# Patient Record
Sex: FEMALE | Race: WHITE | URBAN - METROPOLITAN AREA
[De-identification: names, ages, dates, MRNs, and addresses within clinical notes are randomized per-mention and may not be internally consistent; named-entity substitution may affect disease eponyms.]

---

## 2024-09-13 ENCOUNTER — OFFICE VISIT (OUTPATIENT)
Dept: ENT CLINIC | Age: 39
End: 2024-09-13

## 2024-09-13 VITALS
SYSTOLIC BLOOD PRESSURE: 121 MMHG | TEMPERATURE: 98 F | DIASTOLIC BLOOD PRESSURE: 82 MMHG | HEART RATE: 74 BPM | BODY MASS INDEX: 26.49 KG/M2 | WEIGHT: 159 LBS | HEIGHT: 65 IN

## 2024-09-13 DIAGNOSIS — H90.2 MIDDLE EAR CONDUCTIVE HEARING LOSS: ICD-10-CM

## 2024-09-13 DIAGNOSIS — H65.191 ACUTE EFFUSION OF RIGHT EAR: Primary | ICD-10-CM

## 2024-09-13 RX ORDER — METHYLPREDNISOLONE 4 MG
TABLET, DOSE PACK ORAL
Qty: 1 KIT | Refills: 0 | Status: SHIPPED | OUTPATIENT
Start: 2024-09-13

## 2024-09-13 RX ORDER — LIDOCAINE HYDROCHLORIDE AND EPINEPHRINE BITARTRATE 20; .01 MG/ML; MG/ML
1 INJECTION, SOLUTION SUBCUTANEOUS ONCE
Status: SHIPPED | OUTPATIENT
Start: 2024-09-13

## 2024-09-26 ENCOUNTER — OFFICE VISIT (OUTPATIENT)
Dept: ENT CLINIC | Age: 39
End: 2024-09-26
Payer: COMMERCIAL

## 2024-09-26 VITALS
DIASTOLIC BLOOD PRESSURE: 79 MMHG | HEIGHT: 65 IN | TEMPERATURE: 98.1 F | SYSTOLIC BLOOD PRESSURE: 135 MMHG | WEIGHT: 160.4 LBS | HEART RATE: 66 BPM | BODY MASS INDEX: 26.73 KG/M2

## 2024-09-26 DIAGNOSIS — H90.2 MIDDLE EAR CONDUCTIVE HEARING LOSS: ICD-10-CM

## 2024-09-26 DIAGNOSIS — H65.191 ACUTE EFFUSION OF RIGHT EAR: Primary | ICD-10-CM

## 2024-09-26 PROCEDURE — 1036F TOBACCO NON-USER: CPT | Performed by: OTOLARYNGOLOGY

## 2024-09-26 PROCEDURE — G8427 DOCREV CUR MEDS BY ELIG CLIN: HCPCS | Performed by: OTOLARYNGOLOGY

## 2024-09-26 PROCEDURE — 99213 OFFICE O/P EST LOW 20 MIN: CPT | Performed by: OTOLARYNGOLOGY

## 2024-09-26 PROCEDURE — G8419 CALC BMI OUT NRM PARAM NOF/U: HCPCS | Performed by: OTOLARYNGOLOGY

## 2024-09-26 RX ORDER — METHYLPREDNISOLONE 4 MG
TABLET, DOSE PACK ORAL
Qty: 1 KIT | Refills: 0 | Status: SHIPPED | OUTPATIENT
Start: 2024-09-26

## 2024-10-07 ENCOUNTER — PROCEDURE VISIT (OUTPATIENT)
Dept: AUDIOLOGY | Age: 39
End: 2024-10-07
Payer: COMMERCIAL

## 2024-10-07 ENCOUNTER — OFFICE VISIT (OUTPATIENT)
Dept: ENT CLINIC | Age: 39
End: 2024-10-07
Payer: COMMERCIAL

## 2024-10-07 VITALS
WEIGHT: 161.6 LBS | BODY MASS INDEX: 26.92 KG/M2 | HEART RATE: 82 BPM | TEMPERATURE: 98.5 F | HEIGHT: 65 IN | SYSTOLIC BLOOD PRESSURE: 131 MMHG | DIASTOLIC BLOOD PRESSURE: 81 MMHG

## 2024-10-07 DIAGNOSIS — H90.41 SENSORINEURAL HEARING LOSS (SNHL) OF RIGHT EAR WITH UNRESTRICTED HEARING OF LEFT EAR: Primary | ICD-10-CM

## 2024-10-07 DIAGNOSIS — H91.91 HEARING LOSS OF RIGHT EAR, UNSPECIFIED HEARING LOSS TYPE: Primary | ICD-10-CM

## 2024-10-07 PROCEDURE — 99213 OFFICE O/P EST LOW 20 MIN: CPT | Performed by: OTOLARYNGOLOGY

## 2024-10-07 PROCEDURE — G8419 CALC BMI OUT NRM PARAM NOF/U: HCPCS | Performed by: OTOLARYNGOLOGY

## 2024-10-07 PROCEDURE — G8427 DOCREV CUR MEDS BY ELIG CLIN: HCPCS | Performed by: OTOLARYNGOLOGY

## 2024-10-07 PROCEDURE — 92567 TYMPANOMETRY: CPT

## 2024-10-07 PROCEDURE — 1036F TOBACCO NON-USER: CPT | Performed by: OTOLARYNGOLOGY

## 2024-10-07 PROCEDURE — G8484 FLU IMMUNIZE NO ADMIN: HCPCS | Performed by: OTOLARYNGOLOGY

## 2024-10-07 PROCEDURE — 92557 COMPREHENSIVE HEARING TEST: CPT

## 2024-10-07 NOTE — PROGRESS NOTES
Chanelle Barrett   1985, 39 y.o. female   [id number]       Referring Provider: Tory Alejandre MD  Referral Type: In an order in Epic    Reason for Visit: Evaluation of suspected change in hearing, tinnitus, or balance.    ADULT AUDIOLOGIC EVALUATION      Chanelle Barrett is a 39 y.o. female seen today, 10/7/2024 , for an initial audiologic evaluation.  Patient was seen by Tory Alejandre MD following today's evaluation.    AUDIOLOGIC AND OTHER PERTINENT MEDICAL HISTORY:      Chanelle Barrett reports having double ear infection with tympanic membrane/eardrum perforation in the right ear. At the time of perforation, there was drainage for about 5 days. She would like to obtain baseline test to see if hearing has come back.     She denied otalgia, aural fullness, otorrhea, tinnitus, dizziness, imbalance, history of falls, history of noise exposure, history of head trauma, history of ear surgery, and family history of hearing loss    Date: 10/7/2024     IMPRESSIONS:      Today's results revealed low frequency sensorineural hearing loss in the right ear with Excellent speech understanding when in quiet, bilaterally. Tympanometry indicates normal middle ear function. Discussed test results and implications with patient.  Follow medical recommendations of Tory Alejandre MD.     ASSESSMENT AND FINDINGS:     Otoscopy unremarkable.    RIGHT EAR:  Hearing Sensitivity: Mild sensorineural hearing loss at 250Hz, rising to normal hearing sensitivity.  Speech Recognition Threshold: 15 dB HL  Word Recognition: Excellent 100%, based on NU-6 25-word list at 50 dBHL using recorded speech stimuli.    Tympanometry: Normal peak pressure and compliance, Type A tympanogram, consistent with normal middle ear function.       LEFT EAR:  Hearing Sensitivity:Hearing sensitivity within normal limits from 250-8000 Hz  Speech Recognition Threshold: 10 dB HL  Word Recognition: Excellent 100%, based on NU-6 25-word list at 45 dBHL using

## 2024-10-07 NOTE — PROGRESS NOTES
FOLLOW UP VISIT:    CHIEF COMPLAINT: Hearing loss right ear    INTERIM HISTORY: Seen with middle ear effusions 3-1/2 weeks ago.  The right ear was worse than the left and I performed a myringotomy because of discomfort.  When I saw her back 2 weeks later the myringotomy had healed.  There is still some concern about residual fluid in the right ear as well as some in the left.  I gave her a Medrol Dosepak at that time.  An audiogram was ordered and was performed today.      PAST MEDICAL HISTORY:   Social History     Tobacco Use   Smoking Status Never   Smokeless Tobacco Never                                                    Social History     Substance and Sexual Activity   Alcohol Use Yes                                                  No current outpatient medications on file.                                                 Past Medical History:   Diagnosis Date    Allergic rhinitis     Hearing loss     Tinnitus                                                   No past surgical history on file.    FAMILY HISTORY: Family history reviewed and except as pertinent to the interim history is not contributory.      REVIEW OF SYSTEMS:  All pertinent positive and negative findings included in HPI.  Otherwise, all other systems are reviewed and are negative    PHYSICAL EXAMINATION:   GENERAL: wdwn- no acute distress  RESPIRATORY: No stridor.  COMMUNICATION :  Normal voice  MENTAL STATUS: Alert and oriented x3  HEAD AND FACE: No skin lesions of the face.  EXTERNAL EARS AND NOSE: The external ears and nasal pyramid are normal.  FACIAL MUSCLES: All branches of the facial nerve intact.  FACE PALPATION: Zygomatic arches and orbital rims are intact.  No tenderness over the sinuses.  EXTRAOCULAR MUSCLES: Intact with full range of motion.  OTOSCOPY:  Normal tympanic membranes, middle ear spaces, and external auditory canals.  TUNING FORKS:  Rinne ++ Leggett midline at 512 Hz  INTRANASAL:  Septum midline, turbinates normal, meati

## 2024-11-04 ENCOUNTER — TELEPHONE (OUTPATIENT)
Dept: ENT CLINIC | Age: 39
End: 2024-11-04

## 2024-11-04 NOTE — TELEPHONE ENCOUNTER
Patient called and said that she has another double ear infection and medication is not working. She said that she is having thick brown mucous drainage out of her nose. Please call patient, thank you!

## 2024-11-05 DIAGNOSIS — H65.191 ACUTE EFFUSION OF RIGHT EAR: Primary | ICD-10-CM

## 2024-11-05 RX ORDER — PREDNISONE 10 MG/1
TABLET ORAL
Qty: 20 TABLET | Refills: 0 | Status: SHIPPED | OUTPATIENT
Start: 2024-11-05 | End: 2024-11-15